# Patient Record
Sex: FEMALE | Race: BLACK OR AFRICAN AMERICAN
[De-identification: names, ages, dates, MRNs, and addresses within clinical notes are randomized per-mention and may not be internally consistent; named-entity substitution may affect disease eponyms.]

---

## 2019-01-31 ENCOUNTER — HOSPITAL ENCOUNTER (EMERGENCY)
Dept: HOSPITAL 72 - EMR | Age: 29
Discharge: HOME | End: 2019-01-31
Payer: SELF-PAY

## 2019-01-31 VITALS — DIASTOLIC BLOOD PRESSURE: 72 MMHG | SYSTOLIC BLOOD PRESSURE: 116 MMHG

## 2019-01-31 VITALS — WEIGHT: 240 LBS | BODY MASS INDEX: 32.51 KG/M2 | HEIGHT: 72 IN

## 2019-01-31 VITALS — DIASTOLIC BLOOD PRESSURE: 76 MMHG | SYSTOLIC BLOOD PRESSURE: 114 MMHG

## 2019-01-31 DIAGNOSIS — J40: Primary | ICD-10-CM

## 2019-01-31 PROCEDURE — 99282 EMERGENCY DEPT VISIT SF MDM: CPT

## 2019-01-31 PROCEDURE — 99283 EMERGENCY DEPT VISIT LOW MDM: CPT

## 2019-01-31 NOTE — NUR
ER Nurse Note:



Pt seen, treated, medically cleared for discharge by ERMD. Discharge 
instructions and prescriptions given with repeat verbalization by pt. 
Instructed pt to follow up with primary care physican within one week. Pt 
a&ox4, VSS, no signs of distress. ID removed. Pt left with steady gait with all 
belongings via own transportation.

## 2019-01-31 NOTE — EMERGENCY ROOM REPORT
History of Present Illness


General


Chief Complaint:  Flu Like Symptoms


Source:  Patient





Present Illness


HPI


28-year-old female presents to the emergency department complaining of 

persistent dry cough 5 days.  Patient reports that she has taken Tessalon 

Perles and over-the-counter cough and cold medications with no relief.  Patient 

states that this is the second time she is being evaluated for her symptoms. 

painful cough that is 8/10 in severity. Patient denies fevers and chills she 

reports coughing is worse at night and is causing her to have difficulty 

sleeping.  Patient states she is up-to-date with all her vaccinations including 

this years flu vaccination.  Patient denies ill contacts or travel.  She denies 

history of asthma, COPD or tobacco use/smoking. Denies sore throat, ear pain, 

high fevers, lethargy, neck pain/stiffness, irritability, photophobia 

dehydration, N/V/D. Denies Cp, Palpitations, LOC, AMS, seizures, paresthesias, 

or changes in Hearing or vision, no Sudden severe HA. Denies hx of smoking, 

asthma or COPD.


Allergies:  


Coded Allergies:  


     No Known Allergies (Unverified , 19)





Patient History


Past Medical History:  see triage record


Past Surgical History:  none


Pertinent Family History:  none


Last Menstrual Period:  currently on it


Pregnant Now:  No


:  0


Para:  0


Reviewed Nursing Documentation:  PMH: Agreed; PSxH: Agreed





Nursing Documentation-PMH


Past Medical History:  No Stated History





Review of Systems


All Other Systems:  negative except mentioned in HPI





Physical Exam





Vital Signs








  Date Time  Temp Pulse Resp B/P (MAP) Pulse Ox O2 Delivery O2 Flow Rate FiO2


 


19 18:32 98.2 76 18 116/72 97 Room Air  








Sp02 EP Interpretation:  reviewed, normal


General Appearance:  no apparent distress, alert, GCS 15, non-toxic


Head:  normocephalic, atraumatic


Eyes:  bilateral eye normal inspection, bilateral eye PERRL


ENT:  hearing grossly normal, normal pharynx, normal voice


Neck:  full range of motion, no meningismus


Respiratory:  chest non-tender, lungs clear, normal breath sounds, no rhonchi, 

no respiratory distress, no accessory muscle use, speaking full sentences, 

wheezing - expiratory


Cardiovascular #1:  regular rate, rhythm


Musculoskeletal:  back normal, gait/station normal, normal range of motion, non-

tender


Neurologic:  alert, oriented x3, responsive, motor strength/tone normal, 

sensory intact, speech normal, grossly normal


Psychiatric:  judgement/insight normal


Skin:  normal color, no rash, warm/dry, well hydrated


Lymphatic:  no adenopathy





Medical Decision Making


PA Attestation


Dr. rodriguez is my supervising Physician whom patient management has been 

discussed with.


Diagnostic Impression:  


 Primary Impression:  


 Bronchitis


ER Course


28-year-old female presents to the emergency department complaining of 

persistent dry cough 5 days.  Patient reports that she has taken Tessalon 

Perles and over-the-counter cough and cold medications with no relief.  Patient 

states that this is the second time she is being evaluated for her symptoms. 

painful cough that is 8/10 in severity. Patient denies fevers and chills she 

reports coughing is worse at night and is causing her to have difficulty 

sleeping.  Patient states she is up-to-date with all her vaccinations including 

this years flu vaccination.  Patient denies ill contacts or travel.  She denies 

history of asthma, COPD or tobacco use/smoking. Denies sore throat, ear pain, 

high fevers, lethargy, neck pain/stiffness, irritability, photophobia 

dehydration, N/V/D. Denies Cp, Palpitations, LOC, AMS, seizures, paresthesias, 

or changes in Hearing or vision, no Sudden severe HA. Denies hx of smoking, 

asthma or COPD.





Ddx considered but are not limited to URI, pneumonia, PE, strep pharyngitis, 

meningitis.





Vital signs: Pt.is afebrile VS are WNL


H&PE are most consistent with bronchitis





ORDERS: none required at this time, the diagnosis is clinical





ED INTERVENTIONS: None required at this time. 





DISCHARGE: At this time pt. is stable for d/c to home. Will provide printed 

patient care instructions, and any necessary prescriptions. Care plan and 

follow up instructions have been discussed with the patient prior to discharge.





Last Vital Signs








  Date Time  Temp Pulse Resp B/P (MAP) Pulse Ox O2 Delivery O2 Flow Rate FiO2


 


19 19:10 98.2 80 18 116/72 97 Room Air  








Disposition:  HOME, SELF-CARE


Condition:  Stable


Scripts


Prednisone* (PREDNISONE*) 20 Mg Tablet


40 MG ORAL DAILY for 5 Days, #10 TAB


   Prov: [facility]         19 


Albuterol Sulfate* (ALBUTEROL SULFATE MDI*) 8.5 Gm Hfa.aer.ad


2 PUFF INH Q6H, #1 INH 0 Refills


   Prov: [facility]         19 


Codeine/Promethazine Hcl* (PROMETHAZINE-CODEINE SYRUP*) 118 Ml Syrup


5 ML ORAL Q6H PRN for For Cough, #120 ML 0 Refills


   Prov: [facility]         19


Departure Forms:  Return to Work      Return to Work Date:  2019


   Work Restrictions:  None


   Other Restrictions:  May return Sooner if Symptoms have resolved. 


   Return to Full Activity:  2019


Patient Instructions:  Acute Bronchitis, Easy-to-Read





Additional Instructions:  


Take medications as directed. 


 ** Follow up with a Primary Care Provider in 3-5 days, even if your symptoms 

have resolved. ** 


--Please review list of primary care clinics, if you do not already have a 

primary care provider





Return sooner to ED if new symptoms occur, or current symptoms become worse. 


Do not drink alcohol, drive, or operate heavy machinery while taking Cough 

Syrup as this may cause drowsiness. 











- Please note that this Emergency Department Report was dictated using [employer] technology software, occasionally this can lead to 

erroneous entry secondary to interpretation by the dictation equipment.











Verito [facility] 2019 19:29